# Patient Record
Sex: MALE | Race: WHITE
[De-identification: names, ages, dates, MRNs, and addresses within clinical notes are randomized per-mention and may not be internally consistent; named-entity substitution may affect disease eponyms.]

---

## 2018-03-12 ENCOUNTER — HOSPITAL ENCOUNTER (EMERGENCY)
Dept: HOSPITAL 33 - ED | Age: 23
Discharge: HOME | End: 2018-03-12
Payer: COMMERCIAL

## 2018-03-12 VITALS — DIASTOLIC BLOOD PRESSURE: 91 MMHG | HEART RATE: 128 BPM | SYSTOLIC BLOOD PRESSURE: 132 MMHG | OXYGEN SATURATION: 96 %

## 2018-03-12 DIAGNOSIS — K04.7: Primary | ICD-10-CM

## 2018-03-12 PROCEDURE — 99284 EMERGENCY DEPT VISIT MOD MDM: CPT

## 2018-03-12 NOTE — ERPHSYRPT
- History of Present Illness


Time Seen by Provider: 03/12/18 19:06


Source: patient, family (DAD)


Exam Limitations: no limitations


Patient Subjective Stated Complaint: pt here for swelling to left jaw, has 

broken tooth and has seen dentist and is on amoxicillin and states pain and 

swelling worse,


Triage Nursing Assessment: pt alert, has swelling to left lower jaw and gum .


Physician History: 





FOR THE PAST WEEK PT HAS HAD LEFT LOWER MOLAR PAIN; FOR THE PAST 2 DAYS LEFT 

JAW SWELLING AND NAUSEA; TODAY HEADACHE. PT DENIES VOMITING, CHEST PAIN, FEVER, 

CHILLS.


Allergies/Adverse Reactions: 








No Known Drug Allergies Allergy (Verified 03/12/18 18:49)


 





Home Medications: 








No Home Meds [No Home Meds****] 0 08/06/13 [History]


Amoxicillin 500 mg Cap*** [Amoxil 500 mg***] 500 mg TID 03/12/18 [History]





Hx Tetanus, Diphtheria Vaccination/Date Given: Yes


Hx Influenza Vaccination/Date Given: No


Hx Pneumococcal Vaccination/Date Given: No


Immunizations Up to Date: Yes





- Review of Systems


Constitutional: No Fever, No Chills


Ears, Nose, & Throat: Other (LEFT JAW SWELLING/PAIN)


Cardiac: No Chest Pain


Abdominal/Gastrointestinal: Nausea, No Vomiting


Neurological: Headache


All Other Systems: Reviewed and Negative





- Past Medical History


Pertinent Past Medical History: No


Neurological History: No Pertinent History


ENT History: No Pertinent History


Cardiac History: No Pertinent History


Respiratory History: No Pertinent History


Endocrine Medical History: No Pertinent History


Musculoskeletal History: No Pertinent History


GI Medical History: Crohns Disease


 History: No Pertinent History


Psycho-Social History: No Pertinent History


Male Reproductive Disorders: No Pertinent History





- Past Surgical History


Past Surgical History: No





- Social History


Smoking Status: Never smoker


Exposure to second hand smoke: No


Drug Use: none


Patient Lives Alone: No





- Nursing Vital Signs


Nursing Vital Signs: 


 Initial Vital Signs











Temperature  99.7 F   03/12/18 18:56


 


Pulse Rate  132 H  03/12/18 18:56


 


Respiratory Rate  18   03/12/18 18:56


 


Blood Pressure  151/98   03/12/18 18:56


 


O2 Sat by Pulse Oximetry  100   03/12/18 18:56








 Pain Scale











Pain Intensity                 10

















- Physical Exam


General Appearance: alert


Eye Exam: PERRL/EOMI


Ears, Nose, Throat Exam: TMs normal, pharynx normal, other (A LEFT MANDIBULAR 

MOLAR HAS SURROUNDING MILDLY EDEMATOUS, ERYTHEMATOUS AND TENDER GUM )


Neck Exam: normal inspection


Respiratory Exam: lungs clear


Cardiovascular Exam: normal heart sounds


Gastrointestinal/Abdomen Exam: soft, normal bowel sounds


Back Exam: normal range of motion


Extremity Exam: normal inspection, No pedal edema


Neurologic Exam: alert, cooperative


Skin Exam: warm, dry


**SpO2 Interpretation**: normal


SpO2: 100


Oxygen Delivery: Room Air





- Course


Nursing assessment & vital signs reviewed: Yes


Ordered Tests: 


Medication Summary











Generic Name Dose Route Start Last Admin





  Trade Name Freq  PRN Reason Stop Dose Admin


 


Hydrocodone Bitart/Acetaminophen  2 tab  03/12/18 19:26  





  Norco 5/325 Mg***  PO  03/12/18 19:27  





  STAT ONE   


 


Ceftriaxone Sodium  1,000 mg  03/12/18 19:26  





  Rocephin 1000 Mg Inj**  IM  03/12/18 19:27  





  STAT ONE   














- Departure


Time of Disposition: 19:27


Departure Disposition: Home


Clinical Impression: 


 LEFT TOOTH ABSCESS





Condition: Stable


Critical Care Time: No


Referrals: 


MICHELLE BORGES [Primary Care Provider] - 


Instructions:  Tooth Abscess (DC)


Additional Instructions: 


FOLLOW UP WITH PRIVATE DOCTOR & DENTIST TOMORROW.


Prescriptions: 


Naproxen [Naprosyn] 500 mg PO Q12H PRN PRN #20 tablet


 PRN Reason: Pain


Cephalexin Monohydrate [Keflex] 500 mg PO QID #30 capsule